# Patient Record
Sex: MALE | Race: BLACK OR AFRICAN AMERICAN | NOT HISPANIC OR LATINO | Employment: UNEMPLOYED | URBAN - METROPOLITAN AREA
[De-identification: names, ages, dates, MRNs, and addresses within clinical notes are randomized per-mention and may not be internally consistent; named-entity substitution may affect disease eponyms.]

---

## 2017-05-07 ENCOUNTER — HOSPITAL ENCOUNTER (EMERGENCY)
Facility: CLINIC | Age: 22
Discharge: ANOTHER HOSPITAL | End: 2017-05-07
Attending: EMERGENCY MEDICINE | Admitting: EMERGENCY MEDICINE
Payer: COMMERCIAL

## 2017-05-07 ENCOUNTER — APPOINTMENT (OUTPATIENT)
Dept: CT IMAGING | Facility: CLINIC | Age: 22
End: 2017-05-07
Attending: EMERGENCY MEDICINE
Payer: COMMERCIAL

## 2017-05-07 ENCOUNTER — HOSPITAL ENCOUNTER (EMERGENCY)
Facility: CLINIC | Age: 22
Discharge: HOME | End: 2017-05-07
Attending: FAMILY MEDICINE | Admitting: FAMILY MEDICINE
Payer: COMMERCIAL

## 2017-05-07 VITALS
SYSTOLIC BLOOD PRESSURE: 128 MMHG | OXYGEN SATURATION: 98 % | WEIGHT: 190 LBS | DIASTOLIC BLOOD PRESSURE: 71 MMHG | RESPIRATION RATE: 18 BRPM | HEART RATE: 92 BPM | TEMPERATURE: 98.5 F

## 2017-05-07 VITALS
SYSTOLIC BLOOD PRESSURE: 159 MMHG | TEMPERATURE: 98.2 F | OXYGEN SATURATION: 97 % | DIASTOLIC BLOOD PRESSURE: 109 MMHG | HEART RATE: 96 BPM | RESPIRATION RATE: 14 BRPM

## 2017-05-07 DIAGNOSIS — S02.40DA: ICD-10-CM

## 2017-05-07 DIAGNOSIS — S02.401A: ICD-10-CM

## 2017-05-07 DIAGNOSIS — M26.34: ICD-10-CM

## 2017-05-07 DIAGNOSIS — S02.40CA: ICD-10-CM

## 2017-05-07 DIAGNOSIS — S09.93XA DENTAL INJURY, INITIAL ENCOUNTER: ICD-10-CM

## 2017-05-07 LAB
ALBUMIN SERPL-MCNC: 3.3 G/DL (ref 3.4–5)
ALP SERPL-CCNC: 54 U/L (ref 40–150)
ALT SERPL W P-5'-P-CCNC: 19 U/L (ref 0–70)
ANION GAP SERPL CALCULATED.3IONS-SCNC: 9 MMOL/L (ref 3–14)
AST SERPL W P-5'-P-CCNC: 19 U/L (ref 0–45)
BASOPHILS # BLD AUTO: 0 10E9/L (ref 0–0.2)
BASOPHILS NFR BLD AUTO: 0.3 %
BILIRUB SERPL-MCNC: 0.3 MG/DL (ref 0.2–1.3)
BUN SERPL-MCNC: 13 MG/DL (ref 7–30)
CALCIUM SERPL-MCNC: 8.5 MG/DL (ref 8.5–10.1)
CHLORIDE SERPL-SCNC: 107 MMOL/L (ref 94–109)
CO2 SERPL-SCNC: 26 MMOL/L (ref 20–32)
CREAT SERPL-MCNC: 1.12 MG/DL (ref 0.66–1.25)
DIFFERENTIAL METHOD BLD: ABNORMAL
EOSINOPHIL # BLD AUTO: 0.1 10E9/L (ref 0–0.7)
EOSINOPHIL NFR BLD AUTO: 1.4 %
ERYTHROCYTE [DISTWIDTH] IN BLOOD BY AUTOMATED COUNT: 13.6 % (ref 10–15)
GFR SERPL CREATININE-BSD FRML MDRD: 82 ML/MIN/1.7M2
GLUCOSE SERPL-MCNC: 92 MG/DL (ref 70–99)
HCT VFR BLD AUTO: 39.8 % (ref 40–53)
HGB BLD-MCNC: 13.1 G/DL (ref 13.3–17.7)
IMM GRANULOCYTES # BLD: 0 10E9/L (ref 0–0.4)
IMM GRANULOCYTES NFR BLD: 0.1 %
INTERPRETATION ECG - MUSE: NORMAL
LYMPHOCYTES # BLD AUTO: 2.6 10E9/L (ref 0.8–5.3)
LYMPHOCYTES NFR BLD AUTO: 33.2 %
MCH RBC QN AUTO: 28.1 PG (ref 26.5–33)
MCHC RBC AUTO-ENTMCNC: 32.9 G/DL (ref 31.5–36.5)
MCV RBC AUTO: 85 FL (ref 78–100)
MONOCYTES # BLD AUTO: 0.8 10E9/L (ref 0–1.3)
MONOCYTES NFR BLD AUTO: 9.7 %
NEUTROPHILS # BLD AUTO: 4.4 10E9/L (ref 1.6–8.3)
NEUTROPHILS NFR BLD AUTO: 55.3 %
NRBC # BLD AUTO: 0 10*3/UL
NRBC BLD AUTO-RTO: 0 /100
PLATELET # BLD AUTO: 142 10E9/L (ref 150–450)
PLATELET # BLD EST: ABNORMAL 10*3/UL
POTASSIUM SERPL-SCNC: 3.6 MMOL/L (ref 3.4–5.3)
PROT SERPL-MCNC: 6.6 G/DL (ref 6.8–8.8)
RBC # BLD AUTO: 4.66 10E12/L (ref 4.4–5.9)
SODIUM SERPL-SCNC: 142 MMOL/L (ref 133–144)
TROPONIN I SERPL-MCNC: NORMAL UG/L (ref 0–0.04)
WBC # BLD AUTO: 7.9 10E9/L (ref 4–11)

## 2017-05-07 PROCEDURE — 70450 CT HEAD/BRAIN W/O DYE: CPT

## 2017-05-07 PROCEDURE — 96372 THER/PROPH/DIAG INJ SC/IM: CPT

## 2017-05-07 PROCEDURE — 25000128 H RX IP 250 OP 636: Performed by: EMERGENCY MEDICINE

## 2017-05-07 PROCEDURE — D4321 ZZHC DENTAL PROVISIONAL SPLINTING EXTRACORONAL: HCPCS | Performed by: FAMILY MEDICINE

## 2017-05-07 PROCEDURE — 84484 ASSAY OF TROPONIN QUANT: CPT | Performed by: FAMILY MEDICINE

## 2017-05-07 PROCEDURE — 25000128 H RX IP 250 OP 636: Performed by: FAMILY MEDICINE

## 2017-05-07 PROCEDURE — 99285 EMERGENCY DEPT VISIT HI MDM: CPT | Mod: 25 | Performed by: FAMILY MEDICINE

## 2017-05-07 PROCEDURE — 99207 ZZC APP CREDIT; MD BILLING SHARED VISIT: CPT | Mod: Z6 | Performed by: FAMILY MEDICINE

## 2017-05-07 PROCEDURE — 96374 THER/PROPH/DIAG INJ IV PUSH: CPT | Performed by: FAMILY MEDICINE

## 2017-05-07 PROCEDURE — 25000132 ZZH RX MED GY IP 250 OP 250 PS 637: Performed by: EMERGENCY MEDICINE

## 2017-05-07 PROCEDURE — 80053 COMPREHEN METABOLIC PANEL: CPT | Performed by: FAMILY MEDICINE

## 2017-05-07 PROCEDURE — 72125 CT NECK SPINE W/O DYE: CPT

## 2017-05-07 PROCEDURE — 70486 CT MAXILLOFACIAL W/O DYE: CPT

## 2017-05-07 PROCEDURE — 99285 EMERGENCY DEPT VISIT HI MDM: CPT | Mod: 25

## 2017-05-07 PROCEDURE — 85025 COMPLETE CBC W/AUTO DIFF WBC: CPT | Performed by: FAMILY MEDICINE

## 2017-05-07 RX ORDER — OXYCODONE HYDROCHLORIDE 5 MG/1
5 TABLET ORAL EVERY 6 HOURS PRN
Qty: 20 TABLET | Refills: 0 | Status: SHIPPED | OUTPATIENT
Start: 2017-05-07 | End: 2018-08-17

## 2017-05-07 RX ORDER — OXYCODONE HYDROCHLORIDE 5 MG/1
5 TABLET ORAL ONCE
Status: COMPLETED | OUTPATIENT
Start: 2017-05-07 | End: 2017-05-07

## 2017-05-07 RX ORDER — HYDROMORPHONE HYDROCHLORIDE 1 MG/ML
0.5 INJECTION, SOLUTION INTRAMUSCULAR; INTRAVENOUS; SUBCUTANEOUS ONCE
Status: COMPLETED | OUTPATIENT
Start: 2017-05-07 | End: 2017-05-07

## 2017-05-07 RX ORDER — CHLORHEXIDINE GLUCONATE ORAL RINSE 1.2 MG/ML
15 SOLUTION DENTAL 2 TIMES DAILY
Qty: 300 ML | Refills: 1 | Status: SHIPPED | OUTPATIENT
Start: 2017-05-07 | End: 2018-08-17

## 2017-05-07 RX ADMIN — HYDROMORPHONE HYDROCHLORIDE 0.5 MG: 1 INJECTION, SOLUTION INTRAMUSCULAR; INTRAVENOUS; SUBCUTANEOUS at 03:58

## 2017-05-07 RX ADMIN — OXYCODONE HYDROCHLORIDE 5 MG: 5 TABLET ORAL at 02:50

## 2017-05-07 RX ADMIN — HYDROMORPHONE HYDROCHLORIDE 1 MG: 1 INJECTION, SOLUTION INTRAMUSCULAR; INTRAVENOUS; SUBCUTANEOUS at 10:32

## 2017-05-07 ASSESSMENT — ENCOUNTER SYMPTOMS
EYE REDNESS: 0
TROUBLE SWALLOWING: 0
HEADACHES: 0
NUMBNESS: 0
NECK STIFFNESS: 0
ABDOMINAL PAIN: 0
DIFFICULTY URINATING: 0
ABDOMINAL PAIN: 0
NECK PAIN: 0
VOMITING: 0
FEVER: 0
WOUND: 1
BACK PAIN: 0
NECK PAIN: 0
ARTHRALGIAS: 0
COLOR CHANGE: 0
CONFUSION: 0
HEADACHES: 0
SHORTNESS OF BREATH: 0
WOUND: 1

## 2017-05-07 NOTE — ED PROVIDER NOTES
History     Chief Complaint   Patient presents with     Fall     Trauma     HPI  Ortega Olivarez is a 21 year old male who sustained a fall and facial injury earlier this morning. He apparently was running when he tripped striking his face on the concrete and then lost consciousness maybe approximately 2 minutes.  He had suffered some abrasions to the face and forearms.  He was taken to [facility] where he was evaluated and was found to have a displaced central incisors bilaterally as well as a lateral incisor that was also displaced on the left side.  The patient had a CT scan of the maxillofacial area showing a comminuted complex mildly displaced fracture maxilla involving anterior nasal spine extends inferiorly along the central sagittal of the right lateral incisor.  He did undergo CT of the cervical spine and head which were negative.  He s here in the ER to be evaluated of OMS.  Patient currently is doing well as regards to pain control.  He has no specific numbness to the face.  No visual complaints.  Patient has no difficulty swallowing and no neck pain.  At this point, the patient seems to be comfortable after pain relief with oxycodone and Dilaudid IV.    This part of the medical record was transcribed by Aure Zhao Scribjude, from a dictation done by Durga Jain MD.     PAST MEDICAL HISTORY  History reviewed. No pertinent past medical history.  PAST SURGICAL HISTORY  Past Surgical History:   Procedure Laterality Date     ORTHOPEDIC SURGERY       FAMILY HISTORY  No family history on file.  SOCIAL HISTORY  Social History   Substance Use Topics     Smoking status: Not on file     Smokeless tobacco: Not on file     Alcohol use Not on file     MEDICATIONS  No current facility-administered medications for this encounter.      Current Outpatient Prescriptions   Medication     chlorhexidine (PERIDEX) 0.12 % solution     oxyCODONE (ROXICODONE) 5 MG IR tablet     amoxicillin-clavulanate (AUGMENTIN)  875-125 MG per tablet     ALLERGIES  No Known Allergies   I have reviewed the Medications, Allergies, Past Medical and Surgical History, and Social History in the Epic system.    Review of Systems   Constitutional: Negative for fever.   HENT: Positive for dental problem (Displaced left and right central incisors and right lateral incisor). Negative for congestion and trouble swallowing.         Positive for complex mildly displaced fracture maxilla   Eyes: Negative for redness and visual disturbance.   Respiratory: Negative for shortness of breath.    Cardiovascular: Negative for chest pain.   Gastrointestinal: Negative for abdominal pain.   Genitourinary: Negative for difficulty urinating.   Musculoskeletal: Negative for arthralgias, neck pain and neck stiffness.   Skin: Positive for wound ( abrasions to the face and forearms). Negative for color change.   Neurological: Negative for numbness and headaches.        Positive for loss of consciousness.    Psychiatric/Behavioral: Negative for confusion.       Physical Exam   BP: 137/81  Pulse: 96  Temp: 98.2  F (36.8  C)  Resp: 14  SpO2: 100 %  Physical Exam   Constitutional: He is oriented to person, place, and time. No distress.   HENT:   Head: Atraumatic. Macrocephalic.   Mouth/Throat: Oropharynx is clear and moist. No oropharyngeal exudate.       Eyes: Pupils are equal, round, and reactive to light. No scleral icterus.   Neck: Normal range of motion. Neck supple.   Cardiovascular: Normal rate, regular rhythm, normal heart sounds and intact distal pulses.    Pulmonary/Chest: Effort normal and breath sounds normal. No respiratory distress.   Abdominal: Soft. Bowel sounds are normal. There is no tenderness.   Musculoskeletal: Normal range of motion. He exhibits no edema or tenderness.   Lymphadenopathy:     He has no cervical adenopathy.   Neurological: He is alert and oriented to person, place, and time.   Skin: Skin is warm. No rash noted. He is not diaphoretic.        ED Course     ED Course     Procedures             Critical Care time:  none               Labs Ordered and Resulted from Time of ED Arrival Up to the Time of Departure from the ED   CBC WITH PLATELETS DIFFERENTIAL - Abnormal; Notable for the following:        Result Value    Hemoglobin 13.1 (*)     Hematocrit 39.8 (*)     Platelet Count 142 (*)     All other components within normal limits   COMPREHENSIVE METABOLIC PANEL - Abnormal; Notable for the following:     Albumin 3.3 (*)     Protein Total 6.6 (*)     All other components within normal limits   TROPONIN I   MAY SALINE LOCK IV       Consults  Dental: At Bedside (05/07/17 8583)    Assessments & Plan (with Medical Decision Making)  1.  Comminuted complex mildly displaced fracture of the maxilla   2.  Displaced left and right central incisors and left lateral incisor    This patient is a 21year old male who presents to the ER with a facial trauma.  It is noted have displaced central incisors as well as left lateral incisor.  He was also found to have a comminuted complex mildly displaced fracture of the maxilla.  The patient was told to come here to the ER to have this fixed.  [facility] surgery did arrive, and they splinted and placed an arch bar to the teeth and stabilized the dentition. Please see OMS note regarding the procedure. The patient had received Dilaudid for IV pain control here in the ER.  It was recommended to keep him on antibiotics Augmentin 875 mg BID for 1 week.  Peridex oral rinse,  as well as, some oxycodone for pain control.  Patient was instructed to follow up OMS surgery in 1 week.  Patient understands if he has any increased pain, swelling, fever or any new changes in his condition come back to ER to be seen.    This part of the medical record was transcribed by Aure Zhao Scribe, from a dictation done by Durga Jain MD.        I have reviewed the nursing notes.    I have reviewed the findings, diagnosis, plan and need for  follow up with the patient.    Discharge Medication List as of 5/7/2017 11:11 AM      START taking these medications    Details   chlorhexidine (PERIDEX) 0.12 % solution Swish and spit 15 mLs in mouth 2 times daily, Disp-300 mL, R-1, Local Print      oxyCODONE (ROXICODONE) 5 MG IR tablet Take 1 tablet (5 mg) by mouth every 6 hours as needed for pain, Disp-20 tablet, R-0, Local Print      amoxicillin-clavulanate (AUGMENTIN) 875-125 MG per tablet Take 1 tablet by mouth 2 times daily for 7 days, Disp-14 tablet, R-0, Local Print             Final diagnoses:   Bilateral fracture of maxilla, closed, initial encounter (H)   Dental injury, initial encounter       5/7/2017   [facility], [place], EMERGENCY DEPARTMENT     Durga Jain MD  05/09/17 1115

## 2017-05-07 NOTE — ED PROVIDER NOTES
History     Chief Complaint:  Facial Injury      HPI   Ortega Olivarez is a 21 year old male who presents with a facial injury. The patient reports tonight he was running when he tripped striking his face on the concrete and lost consciousness. The patient is unsure of the length of his unconsciousness but suffered abrasions to his face and unfortunately chipped/broke multiple frontal teeth and suffered a laceration to his right inner cheek mucosa. The patient also complains of lower jaw pain. He was concerned for his injuries which prompts his visit. He denies any injury to his lower teeth. He denies vomiting, headache, neck pain, back pain, abdominal pain, chest pain, or any other injury. He has been able to ambulate following the fall. He notes that his tetanus is up to date.    Allergies:  The patient has no known allergies to medications.      Medications:    The patient is not currently taking any prescribed medications.     Past Medical History:    The patient does not have any pertinent past medical history.     Past Surgical History:    Right knee surgery    Family History:  The patient has no pertinent family history.    Social History:  The patient has no pertinent social history.     Review of Systems   HENT: Positive for dental problem.    Cardiovascular: Negative for chest pain.   Gastrointestinal: Negative for abdominal pain and vomiting.   Musculoskeletal: Negative for back pain and neck pain.   Skin: Positive for wound.   Neurological: Negative for headaches.   All other systems reviewed and are negative.    Physical Exam   First Vitals:  BP: 141/78  Pulse: 92  Heart Rate: 92  Temp: 98.5  F (36.9  C)  Resp: 18  Weight: 86.2 kg (190 lb)  SpO2: 100 %    Physical Exam  Constitutional: The patient is oriented to person, place, and time. Alert and cooperative.  HENT:   Head: No signs of basilar skull fracture.  Right Ear: External ear normal. TM normal appearing.   Left Ear: External ear normal. TM normal  appearing.   Nose: Nose normal.   Mouth/Throat: Tenderness with palpation over mid maxilla. Central incisors and R lateral incisor chipped and mildly displaced. These dentition are loose. No other loose dentition noted. Small laceration to R inner cheek. The posterior oropharynx is unremarkable.    Eyes: Conjunctivae, EOM and lids are normal. Pupils are equal, round, and reactive to light.   Neck: Trachea normal. Normal range of motion. Neck supple.   Cardiovascular: Normal rate, regular rhythm, normal heart sounds, and intact distal pulses.    Pulmonary/Chest: Effort normal and breath sounds equal bilaterally. No crackles or wheezing.   Abdominal: Soft. No tenderness. No rebound and no guarding.   Musculoskeletal: Normal range of motion.  No extremity tenderness or edema. No midline tenderness, step-offs, or deformities in the C/T/L spine.  Neurological: Alert and Oriented. Strength 5/5 in upper and lower extremities bilaterally. Sensation intact to light touch throughout.  Skin: Skin is dry. No rash noted.          Emergency Department Course   Imaging:  Radiographic findings were communicated with the patient who voiced understanding of the findings.    CT Maxillofacial w/o contrast:   Comminuted, complex, mildly displaced fracture of the  maxilla involves the anterior nasal spine and extends inferiorly along  the central incisors and the right lateral incisor.  Results per radiology.     CT Cervical spine w/o contrast:   Unremarkable CT of the cervical spine. No evidence for  acute fracture or malalignment.  Results per radiology.     CT Head w/o contrast:   No acute intracranial abnormality.  Results per radiology.     Interventions:  Oxycodone 5 mg tablet  Dilaudid 0.5 mg IV    Emergency Department Course:  Nursing notes and vitals reviewed.  I performed an exam of the patient as documented above.     The work up above was undertaken.     The patient received the intervention(s) above.     Findings and plan  explained to the Patient. Patient will be transferred to [facility] via private vehicle. Discussed the case with the hospitalist service, who will  admit the patient to a monitored bed for further monitoring, evaluation, and treatment.   Impression & Plan    Medical Decision Making:  Ortega Olivarez is a 21 year old male otherwise healthy who presents to the emergency department for evaluation of a facial injury. Upon presentation in the ED, the patient is non-toxic appearing. Vitals are within normal limits and stable. On exam, he is well appearing. The patient is alert, oriented, and neurologic exam is non focal. Cardiopulmonary exam is unremarkable. Abdomen is soft and non tender throughout. He has full active range of motion of all extremities without significant pain. On facial exam, he has tenderness with palpation over the mid maxilla just beneath the nose. He does have mildly displaced central incisors as well as right lateral incisor. These teeth appear to be chipped and are loose. He does have evidence of a small laceration over the right inner cheek. No active bleeding. No other loose dentition. No signs of basilar skull fracture. No midline tenderness, step offs, or deformities in the midline C/L/T spine.  The rest of the exam is as mentioned above.    CT of the head was obtained and demonstrates no evidence of an acute intracranial process. CT of the c-spine demonstrates no evidence of an acute abnormality. CT of the max face does demonstrate a comminuted, complex, mildly displaced fracture of the maxilla involving the anterior nasal spine and extending inferiorly along the central incisors and the right lateral incisor. These results were discussed with the patient and he notes understanding. The patient's head to toe trauma exam is otherwise unremarkable. The patient was discussed with Dr. Myers of oral maxillofacial surgery at the [facility]. She did recommend transfer to the [facility] so they can  evaluate the patient in the emergency department and likely splint the patient's dentition. I discussed this thoroughly with the patient and he notes understanding and is in agreement of plan. He notes that he prefers to transfer via private vehicle. He was stable/improved at the time of transfer.    Diagnosis:    ICD-10-CM    1. Fracture of maxilla, initial encounter (H) S02.401A        Disposition:  Discharged.    Peter PENA, am serving as a scribe at 4:39 AM on 5/7/2017 to document services personally performed by Dr. Pizarro, based on my observations and the provider's statements to me.    [facility] EMERGENCY DEPARTMENT       Soo Pizarro MD  05/07/17 1739

## 2017-05-07 NOTE — ED AVS SNAPSHOT
[facility], [place], Emergency Department    [address] [place] [address]    Phone:  [phone]                                       Ortega Olivarez   MRN: [id number]    Department:  [facility], Emergency Department   Date of Visit:  5/7/2017           After Visit Summary Signature Page     I have received my discharge instructions, and my questions have been answered. I have discussed any challenges I see with this plan with the nurse or doctor.    ..........................................................................................................................................  Patient/Patient Representative Signature      ..........................................................................................................................................  Patient Representative Print Name and Relationship to Patient    ..................................................               ................................................  Date                                            Time    ..........................................................................................................................................  Reviewed by Signature/Title    ...................................................              ..............................................  Date                                                            Time

## 2017-05-07 NOTE — ED NOTES
Pt states he was walking home and tripped, hitting face on concrete. Pt with broken teeth, swelling, no uncontrolled bleeding, states LOC but unsure how long, denies ETOH or drug use. ABC's intact, alert and oriented X3.

## 2017-05-07 NOTE — ED AVS SNAPSHOT
[facility], Emergency Department    [address] [address]    Phone:  [phone]                                       Ortega Olivarez   MRN: [id number]    Department:  [facility], Emergency Department   Date of Visit:  5/7/2017           Patient Information     Date Of Birth          1995        Your diagnoses for this visit were:     Bilateral fracture of maxilla, closed, initial encounter (H)     Dental injury, initial encounter        You were seen by Durga Jain MD.        Discharge Instructions       Please make an appointment to follow up with [facility] (phone: [phone]) in 7 days for follow up.  Start antibiotics twice a day.  Use oxycodone for pain control every 4 hours as needed.  Use Peridex mouthwash 15 ML's twice daily to keep the area clean.  Watch for signs of infection-swelling, redness, drainage.  Eat soft foods as this will be easier to allow the area to hea.        24 Hour Appointment Hotline       To make an appointment at any [facility] clinic, call [phone] ([phone]). If you don't have a family doctor or clinic, we will help you find one. [facility] clinics are conveniently located to serve the needs of you and your family.             Review of your medicines      START taking        Dose / Directions Last dose taken    amoxicillin-clavulanate 875-125 MG per tablet   Commonly known as:  AUGMENTIN   Dose:  1 tablet   Quantity:  14 tablet        Take 1 tablet by mouth 2 times daily for 7 days   Refills:  0        chlorhexidine 0.12 % solution   Commonly known as:  PERIDEX   Dose:  15 mL   Quantity:  300 mL        Swish and spit 15 mLs in mouth 2 times daily   Refills:  1        oxyCODONE 5 MG IR tablet   Commonly known as:  ROXICODONE   Dose:  5 mg   Quantity:  20 tablet        Take 1 tablet (5 mg) by mouth every 6 hours as needed for pain   Refills:  0                Prescriptions were sent or printed at these locations (3 Prescriptions)   "                 Other Prescriptions                Printed at Department/Unit printer (3 of 3)         chlorhexidine (PERIDEX) 0.12 % solution               oxyCODONE (ROXICODONE) 5 MG IR tablet               amoxicillin-clavulanate (AUGMENTIN) 875-125 MG per tablet                Procedures and tests performed during your visit     CBC with platelets differential    Comprehensive metabolic panel    EKG 12 lead    Saline Lock IV    Troponin I      Orders Needing Specimen Collection     None      Pending Results     No orders found from 5/5/2017 to 5/8/2017.            Pending Culture Results     No orders found from 5/5/2017 to 5/8/2017.            Pending Results Instructions     If you had any lab results that were not finalized at the time of your Discharge, you can call the ED Lab Result RN at [phone]. You will be contacted by this team for any positive Lab results or changes in treatment. The nurses are available 7 days a week from 10A to 6:30P.  You can leave a message 24 hours per day and they will return your call.        Thank you for choosing [facility]       Thank you for choosing [facility] for your care. Our goal is always to provide you with excellent care. Hearing back from our patients is one way we can continue to improve our services. Please take a few minutes to complete the written survey that you may receive in the mail after you visit with us. Thank you!        [email or web address] Information     [email or web address] lets you send messages to your doctor, view your test results, renew your prescriptions, schedule appointments and more. To sign up, go to [email or web address] . Click on \"Log in\" on the left side of the screen, which will take you to the Welcome page. Then click on \"Sign up Now\" on the right side of the page.     You will be asked to enter the access code listed below, as well as some personal information. Please follow the directions to create your username and password.     Your access code is: " [email or web address]  Expires: 2017 11:04 AM     Your access code will  in 90 days. If you need help or a new code, please call your [place] clinic or [phone].        Care EveryWhere ID     This is your Care EveryWhere ID. This could be used by other organizations to access your [place] medical records  [id number]        After Visit Summary       This is your record. Keep this with you and show to your community pharmacist(s) and doctor(s) at your next visit.

## 2017-05-07 NOTE — ED NOTES
Pt wants to go via private vehicle to [facility]. Transfer documents provided to pt. Pt was advise to go straight to [facility] ED and to give transfer documents to staff. Pt verbalize understanding. Pt ambulatory and ambulated out of ED in stable condition.

## 2017-05-07 NOTE — LETTER
[facility] EMERGENCY DEPARTMENT  [address] [place] [address]  [phone]    May 7, 2017    Ortega Olivarez  [address] [place] [address]  [phone] (home) none (work)    : 1995      To Whom it may concern:    Ortega lOivarez was seen in our Emergency Department today, May 7, 2017. During his Emergency Department stay, the patient was given Opioid medications for pain.   Sincerely,        Soo Pizarro

## 2017-05-07 NOTE — DISCHARGE INSTRUCTIONS
Please make an appointment to follow up with [facility] (phone: [phone]) in 7 days for follow up.  Start antibiotics twice a day.  Use oxycodone for pain control every 4 hours as needed.  Use Peridex mouthwash 15 ML's twice daily to keep the area clean.  Watch for signs of infection-swelling, redness, drainage.  Eat soft foods as this will be easier to allow the area to hea.

## 2017-05-07 NOTE — ED NOTES
Pt presents via private vehicle from [facility] s/p trip and fall on concrete, hitting face on floor. Denies drugs or ETOH.  Pt reports loc for a short period.  Per [facility], pt sustained a maxilla fx.  No active bleed noted.  Fracture of top front teeth x 4 noted.  B/l lower arms and chin abrasions noted.
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
## 2018-08-17 ENCOUNTER — HOSPITAL ENCOUNTER (EMERGENCY)
Facility: CLINIC | Age: 23
Discharge: HOME | End: 2018-08-17
Attending: EMERGENCY MEDICINE | Admitting: EMERGENCY MEDICINE

## 2018-08-17 VITALS
SYSTOLIC BLOOD PRESSURE: 152 MMHG | DIASTOLIC BLOOD PRESSURE: 80 MMHG | TEMPERATURE: 99.4 F | RESPIRATION RATE: 16 BRPM | OXYGEN SATURATION: 100 % | HEART RATE: 72 BPM | WEIGHT: 185 LBS

## 2018-08-17 DIAGNOSIS — Z72.0 TOBACCO USE: ICD-10-CM

## 2018-08-17 DIAGNOSIS — K04.7 PERIAPICAL ABSCESS: ICD-10-CM

## 2018-08-17 PROCEDURE — 99283 EMERGENCY DEPT VISIT LOW MDM: CPT | Mod: 25

## 2018-08-17 PROCEDURE — 41800 DRAINAGE OF GUM LESION: CPT

## 2018-08-17 RX ORDER — BUPIVACAINE HYDROCHLORIDE AND EPINEPHRINE 5; 5 MG/ML; UG/ML
1.8 INJECTION, SOLUTION PERINEURAL ONCE
Status: DISCONTINUED | OUTPATIENT
Start: 2018-08-17 | End: 2018-08-17 | Stop reason: HOSPADM

## 2018-08-17 RX ORDER — IBUPROFEN 600 MG/1
600 TABLET, FILM COATED ORAL EVERY 6 HOURS PRN
Qty: 40 TABLET | Refills: 0 | Status: SHIPPED | OUTPATIENT
Start: 2018-08-17 | End: 2018-08-27

## 2018-08-17 RX ORDER — OXYCODONE HYDROCHLORIDE 5 MG/1
5 TABLET ORAL EVERY 6 HOURS PRN
Qty: 8 TABLET | Refills: 0 | Status: SHIPPED | OUTPATIENT
Start: 2018-08-17 | End: 2018-08-20

## 2018-08-17 RX ORDER — IBUPROFEN 600 MG/1
600 TABLET, FILM COATED ORAL ONCE
Status: DISCONTINUED | OUTPATIENT
Start: 2018-08-17 | End: 2018-08-17 | Stop reason: HOSPADM

## 2018-08-17 ASSESSMENT — ENCOUNTER SYMPTOMS
FACIAL SWELLING: 1
COUGH: 0
VOMITING: 0
FEVER: 1

## 2018-08-17 NOTE — ED TRIAGE NOTES
Pt c/o left upper tooth pain, pt also c/o left side facial swelling as well. Pain started about 1 week ago.

## 2018-08-17 NOTE — ED AVS SNAPSHOT
[facility] Emergency Department    [address]    [facility] [address]    Phone:  [phone]    Fax:  [phone]                                       Ortega Olivarez   MRN: [id number]    Department:  [facility] Emergency Department   Date of Visit:  8/17/2018           Patient Information     Date Of Birth          1995        Your diagnoses for this visit were:     Periapical abscess     Tobacco use        You were seen by Nivia Patterson MD.      Follow-up Information     Follow up with Dentist On 8/21/2018.    Why:  As scheduled        Follow up with Clinic, [facility] In 3 days.    Why:  As needed    Contact information:    [address] [facility] [id number] [phone]          Follow up with [facility] Emergency Department.    Specialty:  EMERGENCY MEDICINE    Why:  If symptoms worsen    Contact information:    [address] [facility] [address] [phone]        Discharge Instructions       Take antibiotics as instructed.  Use ibuprofen for mild to moderate pain and oxycodone as needed for severe pain.  Follow-up with dentistry as scheduled.  Return immediately with fever greater than 101 F, severe pain, severe swelling, redness on your face, severe eye pain or headache, or any other issues.    Discharge References/Attachments     DENTAL ABSCESS (ENGLISH)    KICKING THE SMOKING HABIT (ENGLISH)    TOOTH ABSCESS (ENGLISH)      24 Hour Appointment Hotline       To make an appointment at any [facility] clinic, call [phone] ([phone]). If you don't have a family doctor or clinic, we will help you find one. [facility] clinics are conveniently located to serve the needs of you and your family.             Review of your medicines      START taking        Dose / Directions Last dose taken    amoxicillin-clavulanate 875-125 MG per tablet   Commonly known as:  AUGMENTIN   Dose:  1  tablet   Quantity:  14 tablet        Take 1 tablet by mouth 2 times daily for 7 days   Refills:  0        ibuprofen 600 MG tablet   Commonly known as:  ADVIL/MOTRIN   Dose:  600 mg   Quantity:  40 tablet        Take 1 tablet (600 mg) by mouth every 6 hours as needed for moderate pain   Refills:  0        oxyCODONE IR 5 MG tablet   Commonly known as:  ROXICODONE   Dose:  5 mg   Quantity:  8 tablet        Take 1 tablet (5 mg) by mouth every 6 hours as needed for pain   Refills:  0                Information about OPIOIDS     PRESCRIPTION OPIOIDS: WHAT YOU NEED TO KNOW   We gave you an opioid (narcotic) pain medicine. It is important to manage your pain, but opioids are not always the best choice. You should first try all the other options your care team gave you. Take this medicine for as short a time (and as few doses) as possible.    Some activities can increase your pain, such as bandage changes or therapy sessions. It may help to take your pain medicine 30 to 60 minutes before these activities. Reduce your stress by getting enough sleep, working on hobbies you enjoy and practicing relaxation or meditation. Talk to your care team about ways to manage your pain beyond prescription opioids.    These medicines have risks:    DO NOT drive when on new or higher doses of pain medicine. These medicines can affect your alertness and reaction times, and you could be arrested for driving under the influence (DUI). If you need to use opioids long-term, talk to your care team about driving.    DO NOT operate heavy machinery    DO NOT do any other dangerous activities while taking these medicines.    DO NOT drink any alcohol while taking these medicines.     If the opioid prescribed includes acetaminophen, DO NOT take with any other medicines that contain acetaminophen. Read all labels carefully. Look for the word  acetaminophen  or  Tylenol.  Ask your pharmacist if you have questions or are unsure.    You can get addicted to  pain medicines, especially if you have a history of addiction (chemical, alcohol or substance dependence). Talk to your care team about ways to reduce this risk.    All opioids tend to cause constipation. Drink plenty of water and eat foods that have a lot of fiber, such as fruits, vegetables, prune juice, apple juice and high-fiber cereal. Take a laxative (Miralax, milk of magnesia, Colace, Senna) if you don t move your bowels at least every other day. Other side effects include upset stomach, sleepiness, dizziness, throwing up, tolerance (needing more of the medicine to have the same effect), physical dependence and slowed breathing.    Store your pills in a secure place, locked if possible. We will not replace any lost or stolen medicine. If you don t finish your medicine, please throw away (dispose) as directed by your pharmacist. The [place] [employer] has more information about safe disposal: [email or web address]/[email or web address]-medications        Prescriptions were sent or printed at these locations (3 Prescriptions)                   Other Prescriptions                Printed at Department/Unit printer (3 of 3)         amoxicillin-clavulanate (AUGMENTIN) 875-125 MG per tablet               ibuprofen (ADVIL/MOTRIN) 600 MG tablet               oxyCODONE IR (ROXICODONE) 5 MG tablet                Orders Needing Specimen Collection     None      Pending Results     No orders found from 8/15/2018 to 8/18/2018.            Pending Culture Results     No orders found from 8/15/2018 to 8/18/2018.            Pending Results Instructions     If you had any lab results that were not finalized at the time of your Discharge, you can call the ED Lab Result RN at [phone]. You will be contacted by this team for any positive Lab results or changes in treatment. The nurses are available 7 days a week from 10A to 6:30P.  You can leave a message 24 hours per day and they will return  your call.        Test Results From Your Hospital Stay               Clinical Quality Measure: Blood Pressure Screening     Your blood pressure was checked while you were in the emergency department today. The last reading we obtained was  BP: 152/80 . Please read the guidelines below about what these numbers mean and what you should do about them.  If your systolic blood pressure (the top number) is less than 120 and your diastolic blood pressure (the bottom number) is less than 80, then your blood pressure is normal. There is nothing more that you need to do about it.  If your systolic blood pressure (the top number) is 120-139 or your diastolic blood pressure (the bottom number) is 80-89, your blood pressure may be higher than it should be. You should have your blood pressure rechecked within a year by a primary care provider.  If your systolic blood pressure (the top number) is 140 or greater or your diastolic blood pressure (the bottom number) is 90 or greater, you may have high blood pressure. High blood pressure is treatable, but if left untreated over time it can put you at risk for heart attack, stroke, or kidney failure. You should have your blood pressure rechecked by a primary care provider within the next 4 weeks.  If your provider in the emergency department today gave you specific instructions to follow-up with your doctor or provider even sooner than that, you should follow that instruction and not wait for up to 4 weeks for your follow-up visit.        Thank you for choosing [facility]       Thank you for choosing [facility] for your care. Our goal is always to provide you with excellent care. Hearing back from our patients is one way we can continue to improve our services. Please take a few minutes to complete the written survey that you may receive in the mail after you visit with us. Thank you!        [employer] Information     [employer] lets you send messages to your doctor, view your test results,  "renew your prescriptions, schedule appointments and more. To sign up, go to [email or web address] . Click on \"Log in\" on the left side of the screen, which will take you to the Welcome page. Then click on \"Sign up Now\" on the right side of the page.     You will be asked to enter the access code listed below, as well as some personal information. Please follow the directions to create your username and password.     Your access code is: [email or web address]  Expires: 11/15/2018  8:26 PM     Your access code will  in 90 days. If you need help or a new code, please call your [facility] clinic or [phone].        Care EveryWhere ID     This is your Care EveryWhere ID. This could be used by other organizations to access your [facility] medical records  [id number]        Equal Access to Services     FABIO THOMAS : Felipa Sesay, reji santizo, jovan bryant, elvin holcomb . So [facility] [phone].    ATENCIÓN: Si habla español, tiene a taylor disposición servicios gratuitos de asistencia lingüística. Llame al [phone].    We comply with applicable federal civil rights laws and [place] laws. We do not discriminate on the basis of race, color, national origin, age, disability, sex, sexual orientation, or gender identity.            After Visit Summary       This is your record. Keep this with you and show to your community pharmacist(s) and doctor(s) at your next visit.                  "

## 2018-08-17 NOTE — ED PROVIDER NOTES
History     Chief Complaint:  Dental pain     HPI:   The history is provided by the patient.     Ortega Olivarez is a 22 year old male smoker who presents with a female  for evaluation of a dental problem. The patient reports that about a week ago he had onset of upper left-sided dental pain with facial swelling. He had an injury to his teeth, but this was a very long time ago. No recent injury. He has been taking ibuprofen for this pain; however, it was not providing any relief. He has had no recorded fever but has felt warm. Rates his pain a 9/10 in severity and notes it does not radiate, but causes the whole side of his face to feel sore. Notes pain is sore with palpation. He denies any cough or vomiting and has no other complaints.     Allergies:  Tylenol [Acetaminophen]      Medications:    The patient is not currently taking any prescribed medications.     Past Medical History:    The patient does not have any past pertinent medical history.     Past Surgical History:    Orthopedic surgery     Family History:    History reviewed. No pertinent family history.      Social History:  Presents with a female    Tobacco use: Smokes cigarettes  Alcohol use: yes   PCP: [facility]    Marital Status:  Single      Review of Systems   Constitutional: Positive for fever (subjective).   HENT: Positive for dental problem and facial swelling.    Respiratory: Negative for cough.    Gastrointestinal: Negative for vomiting.   All other systems reviewed and are negative.    Physical Exam     Patient Vitals for the past 24 hrs:   BP Temp Temp src Pulse Resp SpO2 Weight   08/17/18 1705 152/80 99.4  F (37.4  C) Temporal 72 16 100 % 83.9 kg (185 lb)        Physical Exam  General: WD/WN; well appearing young -American man; cooperative  Head:  Atraumatic  Eyes:  Conjunctivae, lids, and sclerae are normal; EOMI  ENT:    Normal nose; MMM; mild degree of external facial edema the correlates  with intraoral fluctuant periapical abscess visualized superior to the left maxillary lateral incisor; tender to palpation; no facial erythema; left maxillary lateral incisor is tender to palpation and subluxed  Neck:  Supple; normal ROM  Resp:  No respiratory distress  GI:  Nondistended    MS:  Normal ROM  Skin:  Warm; non-diaphoretic; no pallor  Neuro:  Awake; A&Ox3  Psych: Normal mood and affect; normal speech  Vitals reviewed.    Emergency Department Course     Procedures:  PROCEDURE:  Dental Block    LOCATION:  Left infraorbital     ANESTHESIA: Regional block using Bupivacaine 0.5% with Epi, total of 1 mLs    PROCEDURE NOTE: The patient tolerated the procedure well with good relief of his discomfort and there were no complications.     Caitlin-apical Dental Abscess Incision and Drainage Procedure Note:     Indication:  Swelling and fluctuance of gum line consistent with abscess    Location: Left lateral maxillary incisor.     Anesthesia:  Hurricane spray, LolliCaine, and dental block    Physician: Nivia Patterson MD    Procedure:  Informed verbal consent was obtained.  Site was correctly identified.  An 11 blade scalpel was used to create single straight incision at the area of maximal fluctuance.  Purulent fluid was drained.  Wound was left open to allow for continued drainage.  Plan is for follow-up with dental/oral surgery.     Patient Status:  Patient tolerated the procedure well.  There were no complications.    Emergency Department Course:  Past medical records, nursing notes, and vitals reviewed.  1842: I performed an exam of the patient and obtained history, as documented above.     1848: Given LolliCaine.     1940: Above procedures given.      2020: I rechecked the patient. Findings and plan explained to the patient and female . Patient discharged home with instructions regarding supportive care, medications, and reasons to return. The importance of close follow-up was reviewed.       Impression  & Plan      Medical Decision Making:  Ortega is a 22-year-old man who presents with 1 week of worsening dental pain and now associated facial swelling.  He has not had measured fever and he has no other concerns or complaints.  On exam he is a mild degree of facial edema though this correlates very well with his easily visualized, fluctuant, tender periapical abscess.  The tooth that is associated with this abscess is tender to palpation and somewhat subluxed.  I suspect that the root is not viable resulting in this laxity and patient almost certainly needs root canal.  There is no evidence of a deep space infection and overall patient appears very well.  Afebrile. No facial cellulitis. The location of the abscess made me hesitant to use an infraorbital dental block as I did not want to enter or spread infection.  Thus, with the use of LolliCaine and Hurricaine spray at I was able to I&D his periapical abscess.  After the abscess had been partially drained and there is improvement in size, I was better able to get an exam and was comfortable using an infraorbital nerve block as above.  I was then able to complete drainage which patient tolerated very well.  There is no indication for imaging at this time and I note that patient's facial edema is improved after procedure.  He states he is overall feeling much better and is comfortable with plan for discharge.  He already has dental appointment in 4 days.  Patient was given ibuprofen and encouraged to continue this going forward.  He will also take oxycodone as needed for severe pain and initiate Augmentin for his infection.  I provided return precautions including those for worsening or deep space infection and answered all the patient's questions.  I advised to stop smoking.  Patient verbalized understanding and is amenable to discharge.      Diagnosis:    ICD-10-CM    1. Periapical abscess K04.7    2. Tobacco use Z72.0        Disposition:  Discharged home with plan  as outlined.     Discharge Medications:  Discharge Medication List as of 8/17/2018  8:26 PM      START taking these medications    Details   amoxicillin-clavulanate (AUGMENTIN) 875-125 MG per tablet Take 1 tablet by mouth 2 times daily for 7 days, Disp-14 tablet, R-0, Local Print      ibuprofen (ADVIL/MOTRIN) 600 MG tablet Take 1 tablet (600 mg) by mouth every 6 hours as needed for moderate pain, Disp-40 tablet, R-0, Local Print      oxyCODONE IR (ROXICODONE) 5 MG tablet Take 1 tablet (5 mg) by mouth every 6 hours as needed for pain, Disp-8 tablet, R-0, Local Print           Scribe Disclosure:   I, Darwin Moreira, am serving as a scribe at 6:42 PM on 8/17/2018 to document services personally performed by Nivia Patterson MD based on my observations and the provider's statements to me.       Nivia Patterson MD  8/17/2018   [facility] EMERGENCY DEPARTMENT       Nivia Patterson MD  08/19/18 1801

## 2018-08-17 NOTE — ED AVS SNAPSHOT
[facility] Emergency Department    [address]    [facility] [address]    Phone:  [phone]    Fax:  [phone]                                       Ortega Olivarez   MRN: [id number]    Department:  [facility] Emergency Department   Date of Visit:  8/17/2018           After Visit Summary Signature Page     I have received my discharge instructions, and my questions have been answered. I have discussed any challenges I see with this plan with the nurse or doctor.    ..........................................................................................................................................  Patient/Patient Representative Signature      ..........................................................................................................................................  Patient Representative Print Name and Relationship to Patient    ..................................................               ................................................  Date                                            Time    ..........................................................................................................................................  Reviewed by Signature/Title    ...................................................              ..............................................  Date                                                            Time

## 2018-08-18 NOTE — DISCHARGE INSTRUCTIONS
Take antibiotics as instructed.  Use ibuprofen for mild to moderate pain and oxycodone as needed for severe pain.  Follow-up with dentistry as scheduled.  Return immediately with fever greater than 101 F, severe pain, severe swelling, redness on your face, severe eye pain or headache, or any other issues.

## 2021-05-09 ENCOUNTER — APPOINTMENT (OUTPATIENT)
Dept: GENERAL RADIOLOGY | Facility: CLINIC | Age: 26
End: 2021-05-09
Attending: EMERGENCY MEDICINE

## 2021-05-09 ENCOUNTER — HOSPITAL ENCOUNTER (EMERGENCY)
Facility: CLINIC | Age: 26
Discharge: HOME | End: 2021-05-09
Attending: EMERGENCY MEDICINE | Admitting: EMERGENCY MEDICINE

## 2021-05-09 VITALS
OXYGEN SATURATION: 100 % | HEART RATE: 75 BPM | SYSTOLIC BLOOD PRESSURE: 127 MMHG | DIASTOLIC BLOOD PRESSURE: 83 MMHG | RESPIRATION RATE: 16 BRPM | TEMPERATURE: 98.8 F

## 2021-05-09 DIAGNOSIS — M54.50 ACUTE BILATERAL LOW BACK PAIN WITHOUT SCIATICA: ICD-10-CM

## 2021-05-09 PROCEDURE — 72100 X-RAY EXAM L-S SPINE 2/3 VWS: CPT

## 2021-05-09 PROCEDURE — 72072 X-RAY EXAM THORAC SPINE 3VWS: CPT

## 2021-05-09 PROCEDURE — 99284 EMERGENCY DEPT VISIT MOD MDM: CPT

## 2021-05-09 PROCEDURE — 250N000013 HC RX MED GY IP 250 OP 250 PS 637: Performed by: EMERGENCY MEDICINE

## 2021-05-09 RX ORDER — IBUPROFEN 600 MG/1
600 TABLET, FILM COATED ORAL ONCE
Status: COMPLETED | OUTPATIENT
Start: 2021-05-09 | End: 2021-05-09

## 2021-05-09 RX ORDER — OXYCODONE HYDROCHLORIDE 5 MG/1
5 TABLET ORAL ONCE
Status: COMPLETED | OUTPATIENT
Start: 2021-05-09 | End: 2021-05-09

## 2021-05-09 RX ADMIN — OXYCODONE HYDROCHLORIDE 5 MG: 5 TABLET ORAL at 04:18

## 2021-05-09 RX ADMIN — IBUPROFEN 600 MG: 600 TABLET ORAL at 04:18

## 2021-05-09 ASSESSMENT — ENCOUNTER SYMPTOMS: BACK PAIN: 1

## 2021-05-09 NOTE — ED PROVIDER NOTES
History     Chief Complaint:  Back Pain      HPI  Ortega Olivarez is a 25 year old male who presents to the emergency department under arrest from police for evaluation of back pain.  Patient was in the process of being arrested when he was tackled and wrestled to the ground by police.  After the incident he complained of back pain thus presents emergency department for further evaluation.  Patient locates pain throughout his lumbar and thoracic spine.  He denies any weakness or numbness or tingling in her lower extremities.    Review of Systems   Musculoskeletal: Positive for back pain.   All other systems reviewed and are negative.      Allergies:  Tylenol [Acetaminophen]    Medications:    The patient is not currently taking any prescribed medications.    Past Medical History:    The patient does not have any past medical history.    Past Surgical History:    Orthopedic surgery    Social History:  The patient was brought to the emergency department by PD.    Physical Exam     Patient Vitals for the past 24 hrs:   BP Temp Temp src Pulse Resp SpO2   05/09/21 0415 -- -- -- -- -- 100 %   05/09/21 0400 (!) 143/93 98.8  F (37.1  C) Oral 94 16 99 %         Physical Exam  General: Patient is alert, awake and interactive when I enter the room  Head: The scalp, face, and head appear normal  Eyes: Conjunctivae are normal  ENT: The nose is normal, Pinnae are normal, External acoustic canals are normal  Neck: Trachea midline  CV: Pulses are normal.   Resp: No respiratory distress   Musc: Tenderness to the lumbar and thoracic paraspinal musculature without any bony step-offs or obvious deformities.  Skin: No rash or lesions noted  Neuro: Speech is normal and fluent. Face is symmetric.  Normal strength and sensation in lower extremities bilaterally  Psych: Normal affect.  Appropriate interactions.    Emergency Department Course   Imaging:  Lumbar spine XR, 2-3 views:  No fracture.  Normal vertebral heights and  alignment.  Normal disc spaces and facets for age.  Normal extraspinal structures. Evidence of chronic appearing L5 pars defects. as per radiology.    Thoracic spine XR, 3 views:  No fracture.  Normal vertebral heights and alignment.  Normal disc spaces for age.  Normal extraspinal structures.  as per radiology.    Emergency Department Course:  Reviewed:  I reviewed the patient's nursing notes, vitals, past medical records, Care Everywhere.     Assessments:  359 I assessed the patient. Exam findings described above.    555 I reassessed the patient.     Interventions:  418 Ibuprofen 600 mg Oral   Oxycodone 5 mg Oral    Disposition:  Discharged to home.    Impression & Plan    Medical Decision Making:  Patient is a 25-year-old gentleman who presents emergency department with back pain following a altercation with police.  Physical exam is reassuring without any distal neurovascular deficits.  X-rays were obtained of the thoracic and lumbar spine which were negative for any fracture or dislocation.  Pain was treated as above.  At this point I feel he is safe for discharge into police custody.    Diagnosis:    ICD-10-CM    1. Acute bilateral low back pain without sciatica  M54.5      Pawel Silver  5/9/2021   EMERGENCY DEPARTMENT  Scribe Disclosure:  I, Pawel Silver, am serving as a scribe at 3:59 AM on 5/9/2021 to document services personally performed by Tenzin Gonzales MD based on my observations and the provider's statements to me.          Tenzin Gonzales MD  05/09/21 0686

## 2021-05-09 NOTE — ED TRIAGE NOTES
Ran from police and was tackled.  Presents in police custody in handcuffs.  Complaining of back pain.

## 2022-12-18 ENCOUNTER — APPOINTMENT (OUTPATIENT)
Dept: GENERAL RADIOLOGY | Facility: CLINIC | Age: 27
End: 2022-12-18
Attending: EMERGENCY MEDICINE

## 2022-12-18 ENCOUNTER — HOSPITAL ENCOUNTER (EMERGENCY)
Facility: CLINIC | Age: 27
Discharge: HOME | End: 2022-12-18
Attending: EMERGENCY MEDICINE | Admitting: EMERGENCY MEDICINE

## 2022-12-18 VITALS
TEMPERATURE: 98 F | OXYGEN SATURATION: 100 % | RESPIRATION RATE: 18 BRPM | SYSTOLIC BLOOD PRESSURE: 99 MMHG | HEART RATE: 83 BPM | DIASTOLIC BLOOD PRESSURE: 53 MMHG

## 2022-12-18 DIAGNOSIS — R06.00 DYSPNEA, UNSPECIFIED TYPE: ICD-10-CM

## 2022-12-18 DIAGNOSIS — Z11.52 ENCOUNTER FOR SCREENING LABORATORY TESTING FOR SEVERE ACUTE RESPIRATORY SYNDROME CORONAVIRUS 2 (SARS-COV-2): ICD-10-CM

## 2022-12-18 DIAGNOSIS — F41.9 ANXIETY: ICD-10-CM

## 2022-12-18 LAB
ALBUMIN SERPL-MCNC: 5 G/DL (ref 3.4–5)
ALP SERPL-CCNC: 63 U/L (ref 40–150)
ALT SERPL W P-5'-P-CCNC: 29 U/L (ref 0–70)
ANION GAP SERPL CALCULATED.3IONS-SCNC: 11 MMOL/L (ref 3–14)
AST SERPL W P-5'-P-CCNC: 18 U/L (ref 0–45)
BASOPHILS # BLD AUTO: 0 10E3/UL (ref 0–0.2)
BASOPHILS NFR BLD AUTO: 0 %
BILIRUB SERPL-MCNC: 0.7 MG/DL (ref 0.2–1.3)
BUN SERPL-MCNC: 18 MG/DL (ref 7–30)
CALCIUM SERPL-MCNC: 10.6 MG/DL (ref 8.5–10.1)
CHLORIDE BLD-SCNC: 103 MMOL/L (ref 94–109)
CO2 SERPL-SCNC: 23 MMOL/L (ref 20–32)
CREAT SERPL-MCNC: 1.03 MG/DL (ref 0.66–1.25)
EOSINOPHIL # BLD AUTO: 0 10E3/UL (ref 0–0.7)
EOSINOPHIL NFR BLD AUTO: 0 %
ERYTHROCYTE [DISTWIDTH] IN BLOOD BY AUTOMATED COUNT: 13.5 % (ref 10–15)
FLUAV RNA SPEC QL NAA+PROBE: NEGATIVE
FLUBV RNA RESP QL NAA+PROBE: NEGATIVE
GFR SERPL CREATININE-BSD FRML MDRD: >90 ML/MIN/1.73M2
GLUCOSE BLD-MCNC: 100 MG/DL (ref 70–99)
HCT VFR BLD AUTO: 49.9 % (ref 40–53)
HGB BLD-MCNC: 16.3 G/DL (ref 13.3–17.7)
IMM GRANULOCYTES # BLD: 0 10E3/UL
IMM GRANULOCYTES NFR BLD: 0 %
LIPASE SERPL-CCNC: 102 U/L (ref 73–393)
LYMPHOCYTES # BLD AUTO: 1.1 10E3/UL (ref 0.8–5.3)
LYMPHOCYTES NFR BLD AUTO: 13 %
MCH RBC QN AUTO: 28.1 PG (ref 26.5–33)
MCHC RBC AUTO-ENTMCNC: 32.7 G/DL (ref 31.5–36.5)
MCV RBC AUTO: 86 FL (ref 78–100)
MONOCYTES # BLD AUTO: 0.4 10E3/UL (ref 0–1.3)
MONOCYTES NFR BLD AUTO: 5 %
NEUTROPHILS # BLD AUTO: 7 10E3/UL (ref 1.6–8.3)
NEUTROPHILS NFR BLD AUTO: 82 %
NRBC # BLD AUTO: 0 10E3/UL
NRBC BLD AUTO-RTO: 0 /100
PLATELET # BLD AUTO: 203 10E3/UL (ref 150–450)
POTASSIUM BLD-SCNC: 3.8 MMOL/L (ref 3.4–5.3)
PROT SERPL-MCNC: 9.7 G/DL (ref 6.8–8.8)
RBC # BLD AUTO: 5.8 10E6/UL (ref 4.4–5.9)
RSV RNA SPEC NAA+PROBE: NEGATIVE
SARS-COV-2 RNA RESP QL NAA+PROBE: NEGATIVE
SODIUM SERPL-SCNC: 137 MMOL/L (ref 133–144)
WBC # BLD AUTO: 8.7 10E3/UL (ref 4–11)

## 2022-12-18 PROCEDURE — 71046 X-RAY EXAM CHEST 2 VIEWS: CPT

## 2022-12-18 PROCEDURE — 99285 EMERGENCY DEPT VISIT HI MDM: CPT | Mod: CS,25

## 2022-12-18 PROCEDURE — 94640 AIRWAY INHALATION TREATMENT: CPT

## 2022-12-18 PROCEDURE — 250N000009 HC RX 250: Performed by: EMERGENCY MEDICINE

## 2022-12-18 PROCEDURE — 80053 COMPREHEN METABOLIC PANEL: CPT | Performed by: EMERGENCY MEDICINE

## 2022-12-18 PROCEDURE — C9803 HOPD COVID-19 SPEC COLLECT: HCPCS

## 2022-12-18 PROCEDURE — 250N000011 HC RX IP 250 OP 636: Performed by: EMERGENCY MEDICINE

## 2022-12-18 PROCEDURE — 85025 COMPLETE CBC W/AUTO DIFF WBC: CPT | Performed by: EMERGENCY MEDICINE

## 2022-12-18 PROCEDURE — 87637 SARSCOV2&INF A&B&RSV AMP PRB: CPT | Performed by: EMERGENCY MEDICINE

## 2022-12-18 PROCEDURE — 96375 TX/PRO/DX INJ NEW DRUG ADDON: CPT

## 2022-12-18 PROCEDURE — 258N000003 HC RX IP 258 OP 636

## 2022-12-18 PROCEDURE — 99284 EMERGENCY DEPT VISIT MOD MDM: CPT | Mod: CS | Performed by: EMERGENCY MEDICINE

## 2022-12-18 PROCEDURE — 83690 ASSAY OF LIPASE: CPT | Performed by: EMERGENCY MEDICINE

## 2022-12-18 PROCEDURE — 36415 COLL VENOUS BLD VENIPUNCTURE: CPT | Performed by: EMERGENCY MEDICINE

## 2022-12-18 PROCEDURE — 96374 THER/PROPH/DIAG INJ IV PUSH: CPT

## 2022-12-18 PROCEDURE — 96361 HYDRATE IV INFUSION ADD-ON: CPT

## 2022-12-18 RX ORDER — ALBUTEROL SULFATE 0.83 MG/ML
2.5 SOLUTION RESPIRATORY (INHALATION)
Status: DISCONTINUED | OUTPATIENT
Start: 2022-12-18 | End: 2022-12-19 | Stop reason: HOSPADM

## 2022-12-18 RX ORDER — ONDANSETRON 4 MG/1
4 TABLET, ORALLY DISINTEGRATING ORAL EVERY 8 HOURS PRN
Qty: 12 TABLET | Refills: 0 | Status: SHIPPED | OUTPATIENT
Start: 2022-12-18

## 2022-12-18 RX ORDER — SODIUM CHLORIDE 9 MG/ML
INJECTION, SOLUTION INTRAVENOUS CONTINUOUS
Status: DISCONTINUED | OUTPATIENT
Start: 2022-12-18 | End: 2022-12-19 | Stop reason: HOSPADM

## 2022-12-18 RX ORDER — HYDROXYZINE HYDROCHLORIDE 25 MG/1
25-50 TABLET, FILM COATED ORAL 3 TIMES DAILY PRN
Qty: 20 TABLET | Refills: 0 | Status: SHIPPED | OUTPATIENT
Start: 2022-12-18

## 2022-12-18 RX ORDER — ONDANSETRON 2 MG/ML
4 INJECTION INTRAMUSCULAR; INTRAVENOUS EVERY 30 MIN PRN
Status: DISCONTINUED | OUTPATIENT
Start: 2022-12-18 | End: 2022-12-19 | Stop reason: HOSPADM

## 2022-12-18 RX ORDER — SODIUM CHLORIDE 9 MG/ML
INJECTION, SOLUTION INTRAVENOUS
Status: COMPLETED
Start: 2022-12-18 | End: 2022-12-18

## 2022-12-18 RX ORDER — LORAZEPAM 2 MG/ML
1 INJECTION INTRAMUSCULAR ONCE
Status: COMPLETED | OUTPATIENT
Start: 2022-12-18 | End: 2022-12-18

## 2022-12-18 RX ADMIN — SODIUM CHLORIDE: 9 INJECTION, SOLUTION INTRAVENOUS at 20:19

## 2022-12-18 RX ADMIN — ALBUTEROL SULFATE 2.5 MG: 2.5 SOLUTION RESPIRATORY (INHALATION) at 19:07

## 2022-12-18 RX ADMIN — ONDANSETRON 4 MG: 2 INJECTION INTRAMUSCULAR; INTRAVENOUS at 20:20

## 2022-12-18 RX ADMIN — LORAZEPAM 1 MG: 2 INJECTION INTRAMUSCULAR; INTRAVENOUS at 20:20

## 2022-12-18 ASSESSMENT — ENCOUNTER SYMPTOMS
SHORTNESS OF BREATH: 1
DIAPHORESIS: 1
BACK PAIN: 1
GASTROINTESTINAL NEGATIVE: 1

## 2022-12-18 ASSESSMENT — ACTIVITIES OF DAILY LIVING (ADL)
ADLS_ACUITY_SCORE: 35
ADLS_ACUITY_SCORE: 35

## 2022-12-19 NOTE — ED PROVIDER NOTES
ED Provider Note  [facility]      History     Chief Complaint   Patient presents with     Shortness of Breath     Anxiety     HPI  Ortega Olivarez is a 27 year old male who presents complaining of shortness of breath.  He denies any history of pulmonary disease no asthma he does report that he struggles with anxiety he said that he had a focus of back pain in his upper back off the midline is not clearly pleuritic it sounds more like chest wall.  He is not hypoxic he is not tachycardic does not have influenza RSV or COVID symptoms.  He has no leg pain or swelling he has no risk factors for DVT or PE.    Past Medical History  No past medical history on file.  Past Surgical History:   Procedure Laterality Date     ORTHOPEDIC SURGERY       hydrOXYzine (ATARAX) 25 MG tablet  ondansetron (ZOFRAN ODT) 4 MG ODT tab      Allergies   Allergen Reactions     Tylenol [Acetaminophen]      Family History  No family history on file.  Social History   Social History     Tobacco Use     Smoking status: Never     Smokeless tobacco: Never   Substance Use Topics     Alcohol use: Yes     Drug use: Yes     Types: Marijuana      Past medical history, past surgical history, medications, allergies, family history, and social history were reviewed with the patient. No additional pertinent items.       Review of Systems   Constitutional: Positive for diaphoresis.   Respiratory: Positive for shortness of breath.    Cardiovascular: Negative for leg swelling.   Gastrointestinal: Negative.    Musculoskeletal: Positive for back pain.   All other systems reviewed and are negative.    A complete review of systems was performed with pertinent positives and negatives noted in the HPI, and all other systems negative.    Physical Exam   BP: (!) 157/71  Pulse: 57  Temp: 98  F (36.7  C)  Resp: 18  SpO2: 100 %  Physical Exam  Vitals and nursing note reviewed.   Cardiovascular:      Rate and Rhythm: Normal rate and  regular rhythm.   Pulmonary:      Effort: Pulmonary effort is normal.      Breath sounds: Normal breath sounds. No decreased breath sounds.   Musculoskeletal:      Cervical back: Neck supple.      Right lower leg: No tenderness. No edema.      Left lower leg: No tenderness. No edema.   Skin:     General: Skin is warm.   Neurological:      General: No focal deficit present.      Mental Status: He is alert and oriented to person, place, and time.   Psychiatric:         Attention and Perception: Attention normal.         Mood and Affect: Mood is anxious.         Speech: Speech normal.         Behavior: Behavior normal.         ED Course      Procedures            Results for orders placed or performed during the hospital encounter of 12/18/22   XR Chest 2 Views     Status: None    Narrative    EXAM: XR CHEST 2 VIEWS  LOCATION: [facility]  DATE/TIME: 12/18/2022 7:46 PM    INDICATION: Short of air  COMPARISON: None.      Impression    IMPRESSION: Negative chest.   Symptomatic Influenza A/B & SARS-CoV2 (COVID-19) Virus PCR Multiplex Nasopharyngeal     Status: Normal    Specimen: Nasopharyngeal; Swab   Result Value Ref Range    Influenza A PCR Negative Negative    Influenza B PCR Negative Negative    RSV PCR Negative Negative    SARS CoV2 PCR Negative Negative    Narrative    Testing was performed using the Xpert Xpress CoV2/Flu/RSV Assay on the [employer] GeneXpert Instrument. This test should be ordered for the detection of SARS-CoV-2 and influenza viruses in individuals who meet clinical and/or epidemiological criteria. Test performance is unknown in asymptomatic patients. This test is for in vitro diagnostic use under the FDA EUA for laboratories certified under CLIA to perform high or moderate complexity testing. This test has not been FDA cleared or approved. A negative result does not rule out the presence of PCR inhibitors in the specimen or target RNA in concentration  below the limit of detection for the assay. If only one viral target is positive but coinfection with multiple targets is suspected, the sample should be re-tested with another FDA cleared, approved, or authorized test, if coinfection would change clinical management. This test was validated by the [facility]. These laboratories are certified under the Clinical Laboratory Improvement Amendments of 1988 (CLIA-88) as qualified to perform high complexity laboratory testing.   Comprehensive metabolic panel     Status: Abnormal   Result Value Ref Range    Sodium 137 133 - 144 mmol/L    Potassium 3.8 3.4 - 5.3 mmol/L    Chloride 103 94 - 109 mmol/L    Carbon Dioxide (CO2) 23 20 - 32 mmol/L    Anion Gap 11 3 - 14 mmol/L    Urea Nitrogen 18 7 - 30 mg/dL    Creatinine 1.03 0.66 - 1.25 mg/dL    Calcium 10.6 (H) 8.5 - 10.1 mg/dL    Glucose 100 (H) 70 - 99 mg/dL    Alkaline Phosphatase 63 40 - 150 U/L    AST 18 0 - 45 U/L    ALT 29 0 - 70 U/L    Protein Total 9.7 (H) 6.8 - 8.8 g/dL    Albumin 5.0 3.4 - 5.0 g/dL    Bilirubin Total 0.7 0.2 - 1.3 mg/dL    GFR Estimate >90 >60 mL/min/1.73m2   Lipase     Status: Normal   Result Value Ref Range    Lipase 102 73 - 393 U/L   CBC with platelets and differential     Status: None   Result Value Ref Range    WBC Count 8.7 4.0 - 11.0 10e3/uL    RBC Count 5.80 4.40 - 5.90 10e6/uL    Hemoglobin 16.3 13.3 - 17.7 g/dL    Hematocrit 49.9 40.0 - 53.0 %    MCV 86 78 - 100 fL    MCH 28.1 26.5 - 33.0 pg    MCHC 32.7 31.5 - 36.5 g/dL    RDW 13.5 10.0 - 15.0 %    Platelet Count 203 150 - 450 10e3/uL    % Neutrophils 82 %    % Lymphocytes 13 %    % Monocytes 5 %    % Eosinophils 0 %    % Basophils 0 %    % Immature Granulocytes 0 %    NRBCs per 100 WBC 0 <1 /100    Absolute Neutrophils 7.0 1.6 - 8.3 10e3/uL    Absolute Lymphocytes 1.1 0.8 - 5.3 10e3/uL    Absolute Monocytes 0.4 0.0 - 1.3 10e3/uL    Absolute Eosinophils 0.0 0.0 - 0.7 10e3/uL    Absolute Basophils 0.0 0.0 - 0.2  10e3/uL    Absolute Immature Granulocytes 0.0 <=0.4 10e3/uL    Absolute NRBCs 0.0 10e3/uL   CBC with platelets differential     Status: None    Narrative    The following orders were created for panel order CBC with platelets differential.  Procedure                               Abnormality         Status                     ---------                               -----------         ------                     CBC with platelets and d...[[id number]]                      Final result                 Please view results for these tests on the individual orders.     Medications   LORazepam (ATIVAN) injection 1 mg (1 mg Intravenous Given 12/18/22 2020)        Assessments & Plan (with Medical Decision Making)   Anxious 27-year-old male comes in complaining of shortness of breath.  He acknowledges that he struggles with anxiety here his vital signs are normal he is not hypoxic he has a normal chest x-ray all of his labs are normal as well including his Covid-19 influenza and RSV.  His lungs are clear he has no excessive work of breathing.  He was given a milligram of Ativan IV.  After period of observation he felt more comfortable he is not tachypneic I feel he can be safely discharged home I gave him a prescription for hydroxyzine to use as needed for anxiety and recomended a primary clinic follow-up.    I have reviewed the nursing notes. I have reviewed the findings, diagnosis, plan and need for follow up with the patient.    Discharge Medication List as of 12/18/2022 10:08 PM      START taking these medications    Details   hydrOXYzine (ATARAX) 25 MG tablet Take 1-2 tablets (25-50 mg) by mouth 3 times daily as needed for anxiety, Disp-20 tablet, R-0, Local Print             Final diagnoses:   Anxiety   Dyspnea, unspecified type       --  Truman Hinojosa MD  [facility] EMERGENCY DEPARTMENT  12/18/2022     Truman Hinojosa MD  12/20/22 5660

## 2022-12-19 NOTE — ED TRIAGE NOTES
Emesis this AM, low back pain two hours later. Now having shortness of breath with exertion and while lying flat.     Triage Assessment     Row Name 12/18/22 9057       Respiratory WDL    Respiratory WDL X;rhythm/pattern    Rhythm/Pattern, Respiratory shortness of breath       Cardiac WDL    Cardiac WDL WDL

## 2022-12-19 NOTE — DISCHARGE INSTRUCTIONS
Your work-up was negative, you have a normal chest x-ray, Covid-19, influenza, RSV were all negative  Try hydroxyzine for your anxiety  Follow-up with a primary care provider  Use Zofran if needed for nausea and vomiting